# Patient Record
Sex: FEMALE | Race: WHITE | Employment: STUDENT | URBAN - METROPOLITAN AREA
[De-identification: names, ages, dates, MRNs, and addresses within clinical notes are randomized per-mention and may not be internally consistent; named-entity substitution may affect disease eponyms.]

---

## 2018-09-29 PROCEDURE — 87338 HPYLORI STOOL AG IA: CPT | Performed by: FAMILY MEDICINE

## 2020-11-24 ENCOUNTER — OFFICE VISIT (OUTPATIENT)
Dept: ORTHOPEDICS CLINIC | Facility: CLINIC | Age: 20
End: 2020-11-24
Payer: COMMERCIAL

## 2020-11-24 DIAGNOSIS — B35.1 ONYCHOMYCOSIS: Primary | ICD-10-CM

## 2020-11-24 PROCEDURE — 99072 ADDL SUPL MATRL&STAF TM PHE: CPT | Performed by: PODIATRIST

## 2020-11-24 PROCEDURE — 99202 OFFICE O/P NEW SF 15 MIN: CPT | Performed by: PODIATRIST

## 2020-11-24 RX ORDER — TERBINAFINE HYDROCHLORIDE 250 MG/1
250 TABLET ORAL DAILY
Qty: 30 TABLET | Refills: 2 | Status: SHIPPED | OUTPATIENT
Start: 2020-11-24 | End: 2021-02-16

## 2020-11-24 NOTE — H&P
[facility] New Patient Note    CC: Patient presents with:  Toenail Care: nail fungus      HPI: This 23year old female presents today with complaints of thickening great toenails right a little worse than left for over 1 year.   She was  studying i pain on exertion  GI: denies abdominal pain and denies heartburn  NEURO: denies headaches  MUSCULO: denies arthritis, back pain      Physical Exam:    There were no vitals taken for this visit. Constitutional: Awake, alert, no distress.    Psychological: A

## 2024-08-21 PROCEDURE — 88342 IMHCHEM/IMCYTCHM 1ST ANTB: CPT | Performed by: PATHOLOGY

## 2024-08-26 ENCOUNTER — LAB REQUISITION (OUTPATIENT)
Age: 24
End: 2024-08-26

## 2024-08-26 DIAGNOSIS — D48.9 NEOPLASM OF UNCERTAIN BEHAVIOR: ICD-10-CM

## 2025-01-23 ENCOUNTER — LAB REQUISITION (OUTPATIENT)
Dept: LAB | Facility: HOSPITAL | Age: 25
End: 2025-01-23
Payer: COMMERCIAL

## 2025-01-23 DIAGNOSIS — N63.13 UNSPECIFIED LUMP IN THE RIGHT BREAST, LOWER OUTER QUADRANT: ICD-10-CM

## 2025-01-23 PROCEDURE — 88307 TISSUE EXAM BY PATHOLOGIST: CPT | Performed by: SURGERY

## 2025-01-23 PROCEDURE — 88321 CONSLTJ&REPRT SLD PREP ELSWR: CPT | Performed by: SURGERY
